# Patient Record
(demographics unavailable — no encounter records)

---

## 2025-04-09 NOTE — HISTORY OF PRESENT ILLNESS
[FreeTextEntry8] : CC: establish care, hands trembling  intention tremor. difficult to write and shave, holding a cup of coffee noted the symptoms about 2-3 months ago.  this was around the time his clobazam was added to his regimen after a breakthrough seizure  pt's wife noted that he is also slurring his speech for about a year now.  had multiple seizures and falls during this time as well.

## 2025-06-09 NOTE — PHYSICAL EXAM
[Well Developed] : well developed [Well Nourished] : well nourished [Normal Conjunctiva] : normal conjunctiva [Normal Venous Pressure] : normal venous pressure [No Murmur] : no murmur [Normal S1, S2] : normal S1, S2 [Good Air Entry] : good air entry [Clear Lung Fields] : clear lung fields [Normal Gait] : normal gait [Soft] : abdomen soft [No Rash] : no rash [No Edema] : no edema [Moves all extremities] : moves all extremities [Alert and Oriented] : alert and oriented [Normal memory] : normal memory

## 2025-06-09 NOTE — HISTORY OF PRESENT ILLNESS
[FreeTextEntry1] : Thank you for referring him for cardiovascular ration.  He is a 40-year-old with a history of Hypertension, diabetes mellitus, hyperlipidemia and epilepsy diagnosed in 2017. During his epilepsy workup he had an implantable loop recorder placed in his left chest and wishes for it to be removed. There were no arrhythmias documented throughout his lifetime and his diagnosis of epilepsy was confirmed since then. He has no known history of coronary artery disease, smoking or family history of premature coronary artery disease. He works as an  and is fairly sedentary. He reported having left chest pain and had a workup about 1 year ago at [facility] for chest pain that was included a stress test that was reportedly normal. He has had no further episodes of chest pain.

## 2025-06-09 NOTE — DISCUSSION/SUMMARY
[FreeTextEntry1] : He is a 40-year-old with hypertension, diabetes mellitus, hypercholesterolemia, epilepsy and an implantable loop recorder in his left chest. ECG shows sinus rhythm with nonspecific ST segment changes. CV risk: He is risk factors for cardiovascular disease are well-controlled and encouraged him to continue his blood pressure and diabetes medication as is.  I encouraged routine aerobic activity of 30 to 45 minutes every other day in order to improve his overall cardiovascular health. His ILR is not serving a purpose anymore and he does not require any further arrhythmia screening. I contacted the electrophysiology team and they agreed to proceed with extraction.  They will contact him. [EKG obtained to assist in diagnosis and management of assessed problem(s)] : EKG obtained to assist in diagnosis and management of assessed problem(s)

## 2025-07-18 NOTE — PHYSICAL EXAM
[Normal] : well developed, well nourished, no acute distress [Normal S1, S2] : normal S1, S2 [No Murmur] : no murmur [Clear Lung Fields] : clear lung fields [Soft] : abdomen soft [Normal Gait] : normal gait [No Edema] : no edema [No Rash] : no rash [No Skin Lesions] : no skin lesions [Moves all extremities] : moves all extremities [Normal Speech] : normal speech [Alert and Oriented] : alert and oriented [Normal memory] : normal memory [de-identified] : Left sternal border surgical incision appears to be well approximated and healing well. No drainage, swelling, or erythema.

## 2025-07-18 NOTE — HISTORY OF PRESENT ILLNESS
[FreeTextEntry1] : Mr. Ibrahim presents for ILR explant wound check. He is a 40-year-old male with a PMHx of HTN, HLD, DM, and epilepsy (diagnosed in 2017). During his epilepsy workup he had an MDT ILR implanted at [facility]. Now the ILR has reached EOL s/p ILR explant on 7/3/25 with Dr. Lopez. Patient denies CP, SOB, palpitations, fever, chills, drainage from site, near syncope or syncope.

## 2025-07-18 NOTE — REVIEW OF SYSTEMS
[Negative] : Psychiatric [Cough] : no cough [Wheezing] : no wheezing [Abdominal Pain] : no abdominal pain [Nausea] : no nausea [Vomiting] : no vomiting [Rash] : no rash [FreeTextEntry5] : See HPI

## 2025-07-18 NOTE — DISCUSSION/SUMMARY
[EKG obtained to assist in diagnosis and management of assessed problem(s)] : EKG obtained to assist in diagnosis and management of assessed problem(s) [FreeTextEntry1] : 41 y/o male with a h/o HTN, HLD, DM, and epilepsy (diagnosed in 2017) s/p ILR implant. Loop recorder has reached EOL and now s/p ILR explant on 7/3/25 with Dr. Lopez.  # ILR explant - Site is healing well, with drainage, swelling, or erythema. - EKG from today is SR - No general or cardiac complaints at this time. - Follow-up as needed.  Thank you for involving us in the care of this pleasant patient.